# Patient Record
Sex: FEMALE | Race: BLACK OR AFRICAN AMERICAN | NOT HISPANIC OR LATINO | URBAN - METROPOLITAN AREA
[De-identification: names, ages, dates, MRNs, and addresses within clinical notes are randomized per-mention and may not be internally consistent; named-entity substitution may affect disease eponyms.]

---

## 2018-08-31 ENCOUNTER — EMERGENCY (EMERGENCY)
Facility: HOSPITAL | Age: 38
LOS: 0 days | Discharge: HOME | End: 2018-08-31
Attending: EMERGENCY MEDICINE
Payer: COMMERCIAL

## 2018-08-31 VITALS
DIASTOLIC BLOOD PRESSURE: 88 MMHG | SYSTOLIC BLOOD PRESSURE: 120 MMHG | TEMPERATURE: 99 F | OXYGEN SATURATION: 98 % | RESPIRATION RATE: 18 BRPM | HEART RATE: 97 BPM

## 2018-08-31 VITALS
RESPIRATION RATE: 17 BRPM | TEMPERATURE: 98 F | WEIGHT: 177.91 LBS | HEART RATE: 109 BPM | OXYGEN SATURATION: 99 % | DIASTOLIC BLOOD PRESSURE: 88 MMHG | SYSTOLIC BLOOD PRESSURE: 141 MMHG

## 2018-08-31 DIAGNOSIS — K61.1 RECTAL ABSCESS: ICD-10-CM

## 2018-08-31 DIAGNOSIS — L02.31 CUTANEOUS ABSCESS OF BUTTOCK: ICD-10-CM

## 2018-08-31 LAB — HCG UR QL: NEGATIVE — SIGNIFICANT CHANGE UP

## 2018-08-31 PROCEDURE — 46040 I&D ISCHIORCT&/PERIRCT ABSC: CPT

## 2018-08-31 PROCEDURE — 99283 EMERGENCY DEPT VISIT LOW MDM: CPT | Mod: 25

## 2018-08-31 RX ORDER — AZTREONAM 2 G
1 VIAL (EA) INJECTION
Qty: 14 | Refills: 0 | OUTPATIENT
Start: 2018-08-31 | End: 2018-09-06

## 2018-08-31 RX ORDER — OXYCODONE AND ACETAMINOPHEN 5; 325 MG/1; MG/1
1 TABLET ORAL ONCE
Qty: 0 | Refills: 0 | Status: DISCONTINUED | OUTPATIENT
Start: 2018-08-31 | End: 2018-08-31

## 2018-08-31 RX ADMIN — Medication 1 TABLET(S): at 12:24

## 2018-08-31 NOTE — ED PROVIDER NOTE - OBJECTIVE STATEMENT
38 yo female presents with abscess to buttock area 3-4 days, pain @6. Patient denies fever, chills, nausea. +history of similar.

## 2018-08-31 NOTE — ED ADULT NURSE NOTE - OBJECTIVE STATEMENT
pt c/o perianal abscess x 3-4 days, sent to ED from urgent care today. topical lidocaine placed prior to arrival. hx: perianal abscess.

## 2018-08-31 NOTE — ED ADULT TRIAGE NOTE - CHIEF COMPLAINT QUOTE
Pt with abscess to perianal area for 3 to 4 days .She has had abscess to the same area in the past. She was seen at urgent care and referred to ed. The abscess 3 cm midline LMP 8/29/2018

## 2018-09-03 LAB
CULTURE RESULTS: SIGNIFICANT CHANGE UP
SPECIMEN SOURCE: SIGNIFICANT CHANGE UP

## 2018-09-07 ENCOUNTER — EMERGENCY (EMERGENCY)
Facility: HOSPITAL | Age: 38
LOS: 0 days | Discharge: HOME | End: 2018-09-07
Attending: EMERGENCY MEDICINE
Payer: COMMERCIAL

## 2018-09-07 VITALS
SYSTOLIC BLOOD PRESSURE: 125 MMHG | DIASTOLIC BLOOD PRESSURE: 84 MMHG | OXYGEN SATURATION: 100 % | RESPIRATION RATE: 18 BRPM | HEART RATE: 90 BPM

## 2018-09-07 VITALS
DIASTOLIC BLOOD PRESSURE: 80 MMHG | HEART RATE: 92 BPM | SYSTOLIC BLOOD PRESSURE: 90 MMHG | WEIGHT: 175.93 LBS | TEMPERATURE: 98 F | OXYGEN SATURATION: 100 % | HEIGHT: 64 IN | RESPIRATION RATE: 18 BRPM

## 2018-09-07 DIAGNOSIS — R53.83 OTHER FATIGUE: ICD-10-CM

## 2018-09-07 DIAGNOSIS — R53.1 WEAKNESS: ICD-10-CM

## 2018-09-07 LAB
ALBUMIN SERPL ELPH-MCNC: 3.2 G/DL — LOW (ref 3.3–5)
ALP SERPL-CCNC: 57 U/L — SIGNIFICANT CHANGE UP (ref 40–120)
ALT FLD-CCNC: 24 U/L — SIGNIFICANT CHANGE UP (ref 12–78)
ANION GAP SERPL CALC-SCNC: 8 MMOL/L — SIGNIFICANT CHANGE UP (ref 5–17)
AST SERPL-CCNC: 17 U/L — SIGNIFICANT CHANGE UP (ref 15–37)
BASOPHILS # BLD AUTO: 0 K/UL — SIGNIFICANT CHANGE UP (ref 0–0.2)
BASOPHILS NFR BLD AUTO: 0 % — SIGNIFICANT CHANGE UP (ref 0–2)
BILIRUB SERPL-MCNC: 0.3 MG/DL — SIGNIFICANT CHANGE UP (ref 0.2–1.2)
BUN SERPL-MCNC: 6 MG/DL — LOW (ref 7–23)
CALCIUM SERPL-MCNC: 8.4 MG/DL — LOW (ref 8.5–10.1)
CHLORIDE SERPL-SCNC: 106 MMOL/L — SIGNIFICANT CHANGE UP (ref 96–108)
CK MB BLD-MCNC: <1.2 % — SIGNIFICANT CHANGE UP (ref 0–3.5)
CK MB CFR SERPL CALC: <1 NG/ML — SIGNIFICANT CHANGE UP (ref 0.5–3.6)
CK SERPL-CCNC: 86 U/L — SIGNIFICANT CHANGE UP (ref 26–192)
CO2 SERPL-SCNC: 25 MMOL/L — SIGNIFICANT CHANGE UP (ref 22–31)
CREAT SERPL-MCNC: 1.09 MG/DL — SIGNIFICANT CHANGE UP (ref 0.5–1.3)
EOSINOPHIL # BLD AUTO: 0 K/UL — SIGNIFICANT CHANGE UP (ref 0–0.5)
EOSINOPHIL NFR BLD AUTO: 0 % — SIGNIFICANT CHANGE UP (ref 0–6)
GLUCOSE SERPL-MCNC: 88 MG/DL — SIGNIFICANT CHANGE UP (ref 70–99)
HCG SERPL-ACNC: <1 MIU/ML — SIGNIFICANT CHANGE UP
HCT VFR BLD CALC: 32.6 % — LOW (ref 34.5–45)
HGB BLD-MCNC: 10.8 G/DL — LOW (ref 11.5–15.5)
LYMPHOCYTES # BLD AUTO: 1.19 K/UL — SIGNIFICANT CHANGE UP (ref 1–3.3)
LYMPHOCYTES # BLD AUTO: 20 % — SIGNIFICANT CHANGE UP (ref 13–44)
MAGNESIUM SERPL-MCNC: 2.2 MG/DL — SIGNIFICANT CHANGE UP (ref 1.6–2.6)
MANUAL SMEAR VERIFICATION: SIGNIFICANT CHANGE UP
MCHC RBC-ENTMCNC: 28.2 PG — SIGNIFICANT CHANGE UP (ref 27–34)
MCHC RBC-ENTMCNC: 33.1 GM/DL — SIGNIFICANT CHANGE UP (ref 32–36)
MCV RBC AUTO: 85.1 FL — SIGNIFICANT CHANGE UP (ref 80–100)
MONOCYTES # BLD AUTO: 0.36 K/UL — SIGNIFICANT CHANGE UP (ref 0–0.9)
MONOCYTES NFR BLD AUTO: 6 % — SIGNIFICANT CHANGE UP (ref 2–14)
NEUTROPHILS # BLD AUTO: 4.4 K/UL — SIGNIFICANT CHANGE UP (ref 1.8–7.4)
NEUTROPHILS NFR BLD AUTO: 74 % — SIGNIFICANT CHANGE UP (ref 43–77)
NRBC # BLD: 0 /100 — SIGNIFICANT CHANGE UP (ref 0–0)
NRBC # BLD: SIGNIFICANT CHANGE UP /100 WBCS (ref 0–0)
PLAT MORPH BLD: NORMAL — SIGNIFICANT CHANGE UP
PLATELET # BLD AUTO: 260 K/UL — SIGNIFICANT CHANGE UP (ref 150–400)
PLATELET COUNT - ESTIMATE: NORMAL — SIGNIFICANT CHANGE UP
POTASSIUM SERPL-MCNC: 4.3 MMOL/L — SIGNIFICANT CHANGE UP (ref 3.5–5.3)
POTASSIUM SERPL-SCNC: 4.3 MMOL/L — SIGNIFICANT CHANGE UP (ref 3.5–5.3)
PROT SERPL-MCNC: 7.7 GM/DL — SIGNIFICANT CHANGE UP (ref 6–8.3)
RBC # BLD: 3.83 M/UL — SIGNIFICANT CHANGE UP (ref 3.8–5.2)
RBC # FLD: 15.6 % — HIGH (ref 10.3–14.5)
RBC BLD AUTO: NORMAL — SIGNIFICANT CHANGE UP
SODIUM SERPL-SCNC: 139 MMOL/L — SIGNIFICANT CHANGE UP (ref 135–145)
WBC # BLD: 5.95 K/UL — SIGNIFICANT CHANGE UP (ref 3.8–10.5)
WBC # FLD AUTO: 5.95 K/UL — SIGNIFICANT CHANGE UP (ref 3.8–10.5)

## 2018-09-07 PROCEDURE — 99284 EMERGENCY DEPT VISIT MOD MDM: CPT

## 2018-09-07 NOTE — ED ADULT NURSE NOTE - NS ED NURSE DC INFO COMPLEXITY
Patient asked questions/Simple: Patient demonstrates quick and easy understanding/Verbalized Understanding/Straightforward: Basic instructions, no meds, no home treatment

## 2018-09-07 NOTE — ED ADULT TRIAGE NOTE - CHIEF COMPLAINT QUOTE
feels pressure on tights and arms since Friday , and if stand too long stared feeling fatigue and feels like going to passed out

## 2018-09-07 NOTE — ED PROVIDER NOTE - MEDICAL DECISION MAKING DETAILS
patietn presnts with prox muscle fatigue.  VSS.  Lab values do not require emergent intervention. patient smiling.  feeling well.  will f/u.  Uneventful ED observation period. Non toxic.  Well appearing. Discussed results and outcome of testing with the patient.  Patient advised to please follow up with their primary care doctor within the next 24 hours and return to the Emergency Department for worsening symptoms or any other concerns.  Patient advised that their doctor may call  to follow up on the specific results of the tests performed today in the emergency department.

## 2018-09-07 NOTE — ED ADULT NURSE NOTE - OBJECTIVE STATEMENT
PT STATES " Since last Friday I have been feeling an achy pressure in both my legs and arms. I also had flu like symptoms last week but that has resolved this weak. I do feel a headache over left eye scale of 5 pain." PT STATES " I HAVE BEEN TAKING APPLE CIDER VINEGAR TABLETS FOR THE PAST WEEK."

## 2018-09-07 NOTE — ED PROVIDER NOTE - OBJECTIVE STATEMENT
Pertinent PMH/PSH/FHx/SHx and Review of Systems contained within:  Patient presents to the ED for prox muscle fatigue without demonstrable weakness.  had I&D one week ago and sent on bactrim.  VSS.  no PMH.  LMP 2 weeks ago.  otherwise basleine.  normal daily function "just feels like my muscles are tired."  no other complaints.  Non toxic.  Well appearing. No aggravating or relieving factors. No other pertinent PMH.  No other pertinent PSH.  No other pertinent FHx.  Patient denies EtOH/tobacco/illicit substance use. No fever/chills, No photophobia/eye pain/changes in vision, No ear pain/sore throat/dysphagia, No chest pain/palpitations, no SOB/cough/wheeze/stridor, No abdominal pain, No N/V/D, no dysuria/frequency/discharge, No neck/back pain, no rash, no changes in neurological status/function.

## 2018-09-07 NOTE — ED PROVIDER NOTE - PHYSICAL EXAMINATION
Gen: Alert, NAD Head: NC, AT, PERRL, EOMI, normal lids/conjunctiva ENT:, normal hearing, patent oropharynx without erythema/exudate, uvula midline Neck: +supple, no tenderness/meningismus/JVD, +Trachea midline  Pulm: Bilateral BS, normal resp effort, no wheeze/stridor/retractions CV: RRR, no M/R/G, +dist pulses Abd: soft, NT/ND, +BS, no hepatosplenomegaly Mskel: no edema/erythema/cyanosis Skin: no rash Neuro: AAOx3, no sensory/motor deficits, CN 2-12 intact

## 2019-03-22 ENCOUNTER — EMERGENCY (EMERGENCY)
Facility: HOSPITAL | Age: 39
LOS: 0 days | Discharge: HOME | End: 2019-03-23
Attending: EMERGENCY MEDICINE
Payer: MEDICARE

## 2019-03-22 VITALS
RESPIRATION RATE: 18 BRPM | WEIGHT: 179.02 LBS | DIASTOLIC BLOOD PRESSURE: 102 MMHG | OXYGEN SATURATION: 100 % | SYSTOLIC BLOOD PRESSURE: 152 MMHG | HEART RATE: 84 BPM | TEMPERATURE: 98 F | HEIGHT: 64 IN

## 2019-03-22 DIAGNOSIS — S52.602A UNSPECIFIED FRACTURE OF LOWER END OF LEFT ULNA, INITIAL ENCOUNTER FOR CLOSED FRACTURE: ICD-10-CM
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
## 2019-03-22 DIAGNOSIS — M25.532 PAIN IN LEFT WRIST: ICD-10-CM
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
## 2019-03-22 DIAGNOSIS — S52.502A UNSPECIFIED FRACTURE OF THE LOWER END OF LEFT RADIUS, INITIAL ENCOUNTER FOR CLOSED FRACTURE: ICD-10-CM
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
## 2019-03-22 PROCEDURE — 99284 EMERGENCY DEPT VISIT MOD MDM: CPT | Mod: 25

## 2019-03-22 RX ORDER — OXYCODONE AND ACETAMINOPHEN 5; 325 MG/1; MG/1
1 TABLET ORAL ONCE
Qty: 0 | Refills: 0 | Status: DISCONTINUED | OUTPATIENT
Start: 2019-03-22 | End: 2019-03-22

## 2019-03-22 RX ORDER — IBUPROFEN 200 MG
600 TABLET ORAL ONCE
Qty: 0 | Refills: 0 | Status: COMPLETED | OUTPATIENT
Start: 2019-03-22 | End: 2019-03-22

## 2019-03-22 NOTE — ED PROVIDER NOTE - OBJECTIVE STATEMENT
Pertinent PMH/PSH/FHx/SHx and Review of Systems contained within:  Patient presents to the ED for left wrist pain.  Patient had fall on outstretched hand today while skating, has wrist pain with deformity.  Denies pain in elbow, shoulder, or clavicle.  Denies head injury, LOC, or use of blood thinners.    Relevant PMHx/SHx/SOCHx/FAMH:  Denies relevant pmh or psh  Patient denies EtOH/tobacco/illicit substance use.    ROS: No fever/chills, No headache/photophobia/eye pain/changes in vision, No ear pain/sore throat/dysphagia, No chest pain/palpitations, no SOB/cough/wheeze/stridor, No abdominal pain, No N/V/D/melena, no dysuria/frequency/discharge, No neck/back pain, no rash, no changes in neurological status/function.

## 2019-03-22 NOTE — ED PROVIDER NOTE - PRINCIPAL DIAGNOSIS
Wrist fracture, closed, left, initial encounter Closed fracture of distal end of left radius, unspecified fracture morphology, initial encounter

## 2019-03-22 NOTE — ED PROVIDER NOTE - PHYSICAL EXAMINATION
Gen: Alert, mild distress on movement, well appearing  Head: NC, AT, PERRL, EOMI, normal lids/conjunctiva  ENT: normal hearing, patent oropharynx without erythema/exudate, uvula midline  Neck: +supple, no tenderness, +Trachea midline  Pulm: Bilateral BS, normal resp effort, no wheeze/stridor/retractions  CV: RRR, no M/R/G, +dist pulses  Abd: soft, NT/ND, +BS, no palpable masses  Mskel: no edema/erythema/cyanosis, +left wrist deformity with swelling and tenderness, distal pulses intact  Skin: no rash, warm/dry  Neuro: AAOx3, no apparent sensory/motor deficits, coordination intact

## 2019-03-22 NOTE — ED PROVIDER NOTE - CLINICAL SUMMARY MEDICAL DECISION MAKING FREE TEXT BOX
Patient with closed displaced left wrist fracture.  VSS.  Patient seen and splinted by ortho, post redux films reviewed by ortho, see their consult note.  Patient to f/u with ortho, splint care discussed. Discussed results and outcome of today's visit with the patient.  Patient advised to please follow up with another healthcare provider within the next 24 hours and return to the Emergency Department for worsening symptoms or any other concerns.  Patient advised that their doctor may call  to follow up on the specific results of the tests performed today in the emergency department.   Patient appears well on discharge.

## 2019-03-22 NOTE — ED PROVIDER NOTE - CARE PLAN
Principal Discharge DX:	Wrist fracture, closed, left, initial encounter Principal Discharge DX:	Closed fracture of distal end of left radius, unspecified fracture morphology, initial encounter  Secondary Diagnosis:	Closed fracture of distal end of left ulna, unspecified fracture morphology, initial encounter

## 2019-03-23 PROCEDURE — 73130 X-RAY EXAM OF HAND: CPT | Mod: 26,LT

## 2019-03-23 PROCEDURE — 73090 X-RAY EXAM OF FOREARM: CPT | Mod: 26,LT

## 2019-03-23 PROCEDURE — 73110 X-RAY EXAM OF WRIST: CPT | Mod: 26,LT,76

## 2019-03-23 RX ORDER — IBUPROFEN 200 MG
1 TABLET ORAL
Qty: 20 | Refills: 0 | OUTPATIENT
Start: 2019-03-23 | End: 2019-03-27

## 2019-03-23 RX ADMIN — Medication 600 MILLIGRAM(S): at 00:23

## 2019-03-23 RX ADMIN — OXYCODONE AND ACETAMINOPHEN 1 TABLET(S): 5; 325 TABLET ORAL at 01:25

## 2019-03-23 RX ADMIN — Medication 600 MILLIGRAM(S): at 00:01

## 2019-03-23 RX ADMIN — OXYCODONE AND ACETAMINOPHEN 1 TABLET(S): 5; 325 TABLET ORAL at 00:23
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
## 2019-03-23 NOTE — ED ADULT NURSE NOTE - CHIEF COMPLAINT QUOTE
c/o l wrist pain s/p fell and injured while roller skating l fingers warm pink and mobile ice and immobilization applied prior to arrival

## 2019-03-23 NOTE — CONSULT NOTE ADULT - ASSESSMENT
38y LHD Female with L Distal Radius Fracture s/p Sugartong splint application  NWB LUE in Sugartong splint with sling for comfort  Keep sling/splint clean, dry, and intact  Do not get splint wet as it will break  Rest, ice, and elevate affected extremity  Post-reduction XR show fracture in adequate anatomic alignment   Discussed with patient, given level of comminution/fracture pattern pt may benefit from operative fixation   Discussed signs symptoms of compartment syndrome, advised to return to OR if symptoms develop  No further Orthopaedic surgical intervention at this time.  Please follow up with Dr. Holliday in office within 5 days of DC from ED. Call office for appointment first thing Monday morning  Plan communicated to ED attending  Pt to be discussed and imaging reviewed with Dr. Hloliday  Ortho stable for DC 38y LHD Female with L Distal Radius and Ulnar Styloid Fracture s/p Sugartong splint application  NWB LUE in Sugartong splint with sling for comfort  Keep sling/splint clean, dry, and intact  Do not get splint wet as it will break  Rest, ice, and elevate affected extremity  Post-reduction XR show fracture in adequate anatomic alignment   Discussed with patient, given level of comminution/fracture pattern pt may benefit from operative fixation   Discussed signs symptoms of compartment syndrome, advised to return to OR if symptoms develop  No further Orthopaedic surgical intervention at this time.  Please follow up with Dr. Holliday in office within 5 days of DC from ED. Call office for appointment first thing Monday morning  Plan communicated to ED attending  Pt to be discussed and imaging reviewed with Dr. Holliday  Ortho stable for DC

## 2019-03-23 NOTE — ED ADULT NURSE NOTE - OBJECTIVE STATEMENT
pt arrives w/ c/o left wrist pain. pt states she was skating and fell. pt landed on left wrist. pt states pain from forearm to wrist. pt denies any nausea , vomiting. pt arrives w/ c/o left wrist pain. pt states she was skating and fell. pt landed on left wrist. pt states pain from forearm to wrist. pt denies any nausea , vomiting.pt appears comfortable and in NAd. waiting results of x ray will continue to monitor.

## 2019-03-25 ENCOUNTER — APPOINTMENT (OUTPATIENT)
Dept: ORTHOPEDIC SURGERY | Facility: CLINIC | Age: 39
End: 2019-03-25
Payer: COMMERCIAL

## 2019-03-25 VITALS
HEIGHT: 64 IN | BODY MASS INDEX: 30.56 KG/M2 | DIASTOLIC BLOOD PRESSURE: 91 MMHG | HEART RATE: 83 BPM | SYSTOLIC BLOOD PRESSURE: 135 MMHG | WEIGHT: 179 LBS

## 2019-03-25 DIAGNOSIS — Z78.9 OTHER SPECIFIED HEALTH STATUS: ICD-10-CM

## 2019-03-25 DIAGNOSIS — Z60.2 PROBLEMS RELATED TO LIVING ALONE: ICD-10-CM

## 2019-03-25 DIAGNOSIS — Z82.61 FAMILY HISTORY OF ARTHRITIS: ICD-10-CM

## 2019-03-25 DIAGNOSIS — Z80.9 FAMILY HISTORY OF MALIGNANT NEOPLASM, UNSPECIFIED: ICD-10-CM

## 2019-03-25 PROCEDURE — 99203 OFFICE O/P NEW LOW 30 MIN: CPT

## 2019-03-25 RX ORDER — IBUPROFEN 600 MG
600 TABLET ORAL
Refills: 0 | Status: ACTIVE | COMMUNITY

## 2019-03-25 RX ORDER — PSYLLIUM HUSK 0.4 G
CAPSULE ORAL
Refills: 0 | Status: ACTIVE | COMMUNITY

## 2019-03-25 RX ORDER — CHOLECALCIFEROL (VITAMIN D3) 25 MCG
TABLET ORAL
Refills: 0 | Status: ACTIVE | COMMUNITY

## 2019-03-25 RX ORDER — CALCIUM CARBONATE 260MG(650)
TABLET,CHEWABLE ORAL
Refills: 0 | Status: ACTIVE | COMMUNITY

## 2019-03-25 RX ORDER — LACTOBACILLUS ACIDOPHILUS/PECT 30 MG-20MG
TABLET ORAL
Refills: 0 | Status: ACTIVE | COMMUNITY

## 2019-03-25 RX ORDER — IBUPROFEN 600 MG/1
600 TABLET, FILM COATED ORAL
Qty: 20 | Refills: 0 | Status: ACTIVE | COMMUNITY
Start: 2019-03-22

## 2019-03-25 SDOH — SOCIAL STABILITY - SOCIAL INSECURITY: PROBLEMS RELATED TO LIVING ALONE: Z60.2

## 2019-03-26 RX ORDER — OXYCODONE AND ACETAMINOPHEN 5; 325 MG/1; MG/1
5-325 TABLET ORAL
Qty: 20 | Refills: 0 | Status: COMPLETED | COMMUNITY
Start: 2019-03-26 | End: 2019-03-26

## 2019-03-26 RX ORDER — OXYCODONE AND ACETAMINOPHEN 5; 325 MG/1; MG/1
5-325 TABLET ORAL
Qty: 20 | Refills: 0 | Status: ACTIVE | COMMUNITY
Start: 2019-03-26 | End: 1900-01-01

## 2019-03-26 RX ORDER — OXYCODONE AND ACETAMINOPHEN 5; 325 MG/1; MG/1
5-325 TABLET ORAL
Qty: 20 | Refills: 0 | Status: DISCONTINUED | COMMUNITY
Start: 2019-03-26 | End: 2019-03-26

## 2019-03-27 ENCOUNTER — OUTPATIENT (OUTPATIENT)
Dept: OUTPATIENT SERVICES | Facility: HOSPITAL | Age: 39
LOS: 1 days | End: 2019-03-27
Payer: COMMERCIAL

## 2019-03-27 VITALS
HEIGHT: 64 IN | RESPIRATION RATE: 16 BRPM | WEIGHT: 179.9 LBS | SYSTOLIC BLOOD PRESSURE: 125 MMHG | OXYGEN SATURATION: 100 % | TEMPERATURE: 98 F | HEART RATE: 80 BPM | DIASTOLIC BLOOD PRESSURE: 84 MMHG

## 2019-03-27 DIAGNOSIS — G56.02 CARPAL TUNNEL SYNDROME, LEFT UPPER LIMB: ICD-10-CM

## 2019-03-27 DIAGNOSIS — Z01.818 ENCOUNTER FOR OTHER PREPROCEDURAL EXAMINATION: ICD-10-CM

## 2019-03-27 DIAGNOSIS — S52.351A DISPLACED COMMINUTED FRACTURE OF SHAFT OF RADIUS, RIGHT ARM, INITIAL ENCOUNTER FOR CLOSED FRACTURE: ICD-10-CM

## 2019-03-27 DIAGNOSIS — S52.352A DISPLACED COMMINUTED FRACTURE OF SHAFT OF RADIUS, LEFT ARM, INITIAL ENCOUNTER FOR CLOSED FRACTURE: ICD-10-CM

## 2019-03-27 LAB
HCT VFR BLD CALC: 32.3 % — LOW (ref 34.5–45)
HGB BLD-MCNC: 10.8 G/DL — LOW (ref 11.5–15.5)
MCHC RBC-ENTMCNC: 30.3 PG — SIGNIFICANT CHANGE UP (ref 27–34)
MCHC RBC-ENTMCNC: 33.4 GM/DL — SIGNIFICANT CHANGE UP (ref 32–36)
MCV RBC AUTO: 90.7 FL — SIGNIFICANT CHANGE UP (ref 80–100)
PLATELET # BLD AUTO: 287 K/UL — SIGNIFICANT CHANGE UP (ref 150–400)
RBC # BLD: 3.56 M/UL — LOW (ref 3.8–5.2)
RBC # FLD: 14.8 % — HIGH (ref 10.3–14.5)
WBC # BLD: 6.3 K/UL — SIGNIFICANT CHANGE UP (ref 3.8–10.5)
WBC # FLD AUTO: 6.3 K/UL — SIGNIFICANT CHANGE UP (ref 3.8–10.5)

## 2019-03-27 RX ORDER — LIDOCAINE HCL 20 MG/ML
0.2 VIAL (ML) INJECTION ONCE
Qty: 0 | Refills: 0 | Status: DISCONTINUED | OUTPATIENT
Start: 2019-03-29 | End: 2019-04-13

## 2019-03-27 RX ORDER — SODIUM CHLORIDE 9 MG/ML
3 INJECTION INTRAMUSCULAR; INTRAVENOUS; SUBCUTANEOUS EVERY 8 HOURS
Qty: 0 | Refills: 0 | Status: DISCONTINUED | OUTPATIENT
Start: 2019-03-29 | End: 2019-04-13

## 2019-03-27 NOTE — H&P PST ADULT - NSANTHOSAYNRD_GEN_A_CORE
No. [place] screening performed.  STOP BANG Legend: 0-2 = LOW Risk; 3-4 = INTERMEDIATE Risk; 5-8 = HIGH Risk

## 2019-03-27 NOTE — H&P PST ADULT - NSICDXPASTMEDICALHX_GEN_ALL_CORE_FT
PAST MEDICAL HISTORY:  Ilda-rectal abscess PAST MEDICAL HISTORY:  Functional heart murmur     Ilda-rectal abscess

## 2019-03-27 NOTE — H&P PST ADULT - HISTORY OF PRESENT ILLNESS
37 yo female with no significant PMH c/o left wrist pain s/p fall while roller skating on 03/22/2019. Pt had ED admission-X-ray left hand reveled displaced comminuted fracture.  Pt scheduled for left ORIF distal radius

## 2019-03-27 NOTE — H&P PST ADULT - NSICDXPROBLEM_GEN_ALL_CORE_FT
PROBLEM DIAGNOSES  Problem: Closed displaced comminuted fracture of shaft of right radius, initial encounter  Assessment and Plan: Left carpal tunnel release  Left  ORIF distal radius  Labs- CBC  Pre-op instructions discussed

## 2019-03-27 NOTE — H&P PST ADULT - MUSCULOSKELETAL
details… detailed exam Left arm soft cast with sling, fingers W&M, with minimal edema/decreased ROM due to pain/diminished strength

## 2019-03-28 ENCOUNTER — TRANSCRIPTION ENCOUNTER (OUTPATIENT)
Age: 39
End: 2019-03-28

## 2019-03-28 RX ORDER — CELECOXIB 200 MG/1
200 CAPSULE ORAL ONCE
Qty: 0 | Refills: 0 | Status: DISCONTINUED | OUTPATIENT
Start: 2019-03-29 | End: 2019-04-13

## 2019-03-28 RX ORDER — OXYCODONE HYDROCHLORIDE 5 MG/1
5 TABLET ORAL ONCE
Qty: 0 | Refills: 0 | Status: DISCONTINUED | OUTPATIENT
Start: 2019-03-29 | End: 2019-03-29

## 2019-03-28 RX ORDER — ONDANSETRON 8 MG/1
4 TABLET, FILM COATED ORAL ONCE
Qty: 0 | Refills: 0 | Status: DISCONTINUED | OUTPATIENT
Start: 2019-03-29 | End: 2019-04-13

## 2019-03-28 RX ORDER — SODIUM CHLORIDE 9 MG/ML
1000 INJECTION, SOLUTION INTRAVENOUS
Qty: 0 | Refills: 0 | Status: DISCONTINUED | OUTPATIENT
Start: 2019-03-29 | End: 2019-04-13

## 2019-03-28 NOTE — ASU DISCHARGE PLAN (ADULT/PEDIATRIC) - CARE PROVIDER_API CALL
Steffanie Mueller (MD; MPH)  Orthopaedic Surgery  [address], [address]  [place] [address]  Phone: [phone]  Fax: [phone]  Follow Up Time:

## 2019-03-28 NOTE — ASU DISCHARGE PLAN (ADULT/PEDIATRIC) - CALL YOUR DOCTOR IF YOU HAVE ANY OF THE FOLLOWING:
Numbness, tingling, color or temperature change to extremity Swelling that gets worse/Fever greater than (need to indicate Fahrenheit or Celsius)/Wound/Surgical Site with redness, or foul smelling discharge or pus/Pain not relieved by Medications/Bleeding that does not stop/Numbness, tingling, color or temperature change to extremity/Increased irritability or sluggishness/Nausea and vomiting that does not stop/Inability to tolerate liquids or foods

## 2019-03-29 ENCOUNTER — APPOINTMENT (OUTPATIENT)
Dept: ORTHOPEDIC SURGERY | Facility: HOSPITAL | Age: 39
End: 2019-03-29

## 2019-03-29 ENCOUNTER — OUTPATIENT (OUTPATIENT)
Dept: OUTPATIENT SERVICES | Facility: HOSPITAL | Age: 39
LOS: 1 days | End: 2019-03-29
Payer: COMMERCIAL

## 2019-03-29 VITALS
DIASTOLIC BLOOD PRESSURE: 84 MMHG | TEMPERATURE: 99 F | OXYGEN SATURATION: 100 % | HEART RATE: 80 BPM | SYSTOLIC BLOOD PRESSURE: 142 MMHG | WEIGHT: 179.9 LBS | RESPIRATION RATE: 16 BRPM | HEIGHT: 64 IN

## 2019-03-29 VITALS
HEART RATE: 84 BPM | DIASTOLIC BLOOD PRESSURE: 80 MMHG | SYSTOLIC BLOOD PRESSURE: 132 MMHG | TEMPERATURE: 98 F | OXYGEN SATURATION: 100 % | RESPIRATION RATE: 16 BRPM

## 2019-03-29 DIAGNOSIS — S52.352A DISPLACED COMMINUTED FRACTURE OF SHAFT OF RADIUS, LEFT ARM, INITIAL ENCOUNTER FOR CLOSED FRACTURE: ICD-10-CM

## 2019-03-29 DIAGNOSIS — G56.02 CARPAL TUNNEL SYNDROME, LEFT UPPER LIMB: ICD-10-CM

## 2019-03-29 PROCEDURE — G0463: CPT

## 2019-03-29 PROCEDURE — 25609 OPTX DST RD XART FX/EP SEP3+: CPT | Mod: LT

## 2019-03-29 PROCEDURE — 64721 CARPAL TUNNEL SURGERY: CPT | Mod: LT

## 2019-03-29 PROCEDURE — 85027 COMPLETE CBC AUTOMATED: CPT

## 2019-03-29 PROCEDURE — 76000 FLUOROSCOPY <1 HR PHYS/QHP: CPT

## 2019-03-29 PROCEDURE — C1713: CPT

## 2019-03-29 RX ORDER — ACETAMINOPHEN 500 MG
1000 TABLET ORAL ONCE
Qty: 0 | Refills: 0 | Status: COMPLETED | OUTPATIENT
Start: 2019-03-29 | End: 2019-03-29

## 2019-03-29 RX ORDER — CELECOXIB 200 MG/1
200 CAPSULE ORAL ONCE
Qty: 0 | Refills: 0 | Status: COMPLETED | OUTPATIENT
Start: 2019-03-29 | End: 2019-03-29

## 2019-03-29 RX ORDER — CEFAZOLIN SODIUM 1 G
2000 VIAL (EA) INJECTION ONCE
Qty: 0 | Refills: 0 | Status: COMPLETED | OUTPATIENT
Start: 2019-03-29 | End: 2019-03-29

## 2019-03-29 RX ADMIN — CELECOXIB 200 MILLIGRAM(S): 200 CAPSULE ORAL at 13:02

## 2019-03-29 RX ADMIN — Medication 1000 MILLIGRAM(S): at 13:02

## 2019-03-29 NOTE — BRIEF OPERATIVE NOTE - NSICDXBRIEFPROCEDURE_GEN_ALL_CORE_FT
PROCEDURES:  Carpal tunnel release 29-Mar-2019 16:25:07  Cristy Escobar  Open reduction and internal fixation (ORIF) of fracture of left distal radius 29-Mar-2019 16:25:00  Cristy Escobar

## 2019-03-29 NOTE — PRE-ANESTHESIA EVALUATION ADULT - NSANTHADDINFOFT_GEN_ALL_CORE
RBA of regional supraclavicular block explained, patient understood, agreed to proceed.  Denies cervical spine pathology

## 2019-03-29 NOTE — BRIEF OPERATIVE NOTE - OPERATION/FINDINGS
See dictated report.  Findings - L distal radius fracture, carpal tunnel syndrome  Procedure - ORIF L distal radius fracture, carpal tunnel release volar splint

## 2019-04-08 ENCOUNTER — APPOINTMENT (OUTPATIENT)
Dept: ORTHOPEDIC SURGERY | Facility: CLINIC | Age: 39
End: 2019-04-08
Payer: COMMERCIAL

## 2019-04-08 PROBLEM — R01.0 BENIGN AND INNOCENT CARDIAC MURMURS: Chronic | Status: ACTIVE | Noted: 2019-03-27

## 2019-04-08 PROCEDURE — 99024 POSTOP FOLLOW-UP VISIT: CPT

## 2019-04-08 PROCEDURE — 73110 X-RAY EXAM OF WRIST: CPT | Mod: LT

## 2019-04-23 ENCOUNTER — TRANSCRIPTION ENCOUNTER (OUTPATIENT)
Age: 39
End: 2019-04-23

## 2019-05-10 ENCOUNTER — APPOINTMENT (OUTPATIENT)
Dept: ORTHOPEDIC SURGERY | Facility: CLINIC | Age: 39
End: 2019-05-10
Payer: COMMERCIAL

## 2019-05-10 VITALS
DIASTOLIC BLOOD PRESSURE: 87 MMHG | SYSTOLIC BLOOD PRESSURE: 130 MMHG | BODY MASS INDEX: 30.56 KG/M2 | HEART RATE: 80 BPM | HEIGHT: 64 IN | WEIGHT: 179 LBS

## 2019-05-10 PROCEDURE — 73110 X-RAY EXAM OF WRIST: CPT | Mod: LT

## 2019-05-10 PROCEDURE — 99024 POSTOP FOLLOW-UP VISIT: CPT

## 2019-06-10 ENCOUNTER — APPOINTMENT (OUTPATIENT)
Dept: ORTHOPEDIC SURGERY | Facility: CLINIC | Age: 39
End: 2019-06-10
Payer: COMMERCIAL

## 2019-06-10 VITALS
DIASTOLIC BLOOD PRESSURE: 84 MMHG | SYSTOLIC BLOOD PRESSURE: 126 MMHG | HEART RATE: 94 BPM | WEIGHT: 185 LBS | HEIGHT: 64 IN | BODY MASS INDEX: 31.58 KG/M2

## 2019-06-10 DIAGNOSIS — G56.02 CARPAL TUNNEL SYNDROME, LEFT UPPER LIMB: ICD-10-CM

## 2019-06-10 DIAGNOSIS — S52.572A OTHER INTRAARTICULAR FRACTURE OF LOWER END OF LEFT RADIUS, INITIAL ENCOUNTER FOR CLOSED FRACTURE: ICD-10-CM

## 2019-06-10 DIAGNOSIS — G56.21 LESION OF ULNAR NERVE, RIGHT UPPER LIMB: ICD-10-CM

## 2019-06-10 PROCEDURE — 99024 POSTOP FOLLOW-UP VISIT: CPT

## 2019-06-26 NOTE — ASU DISCHARGE PLAN (ADULT/PEDIATRIC) - ***IN THE EVENT THAT YOU DEVELOP A COMPLICATION AND YOU ARE UNABLE TO REACH YOUR OWN PHYSICIAN, YOU MAY CONTACT:
Gen: No fever, unable to tolerate feeds  Eyes: No eye irritation or discharge  ENT: No earpain, congestion, sore throat  Resp: No cough or trouble breathing  Cardiovascular: + chest pain or palpitation  Gastroenteric: No nausea/vomiting, diarrhea, constipation  : No dysuria  MS: No joint or muscle pain  Skin: No rashes  Neuro: + headache, numbness in hands and feet  Remainder negative, except as per the HPI Statement Selected

## 2019-10-02 PROBLEM — Z60.2 PERSON LIVING ALONE: Status: ACTIVE | Noted: 2019-03-25

## 2021-03-27 NOTE — ED ADULT TRIAGE NOTE - CHIEF COMPLAINT QUOTE
c/o l wrist pain s/p fell and injured while roller skating l fingers warm pink and mobile ice and immobilization applied prior to arrival dyspnea

## 2022-02-07 NOTE — ED ADULT NURSE NOTE - NSFALLRSKASSESSTYPE_ED_ALL_ED
Occupational Therapy 12S  Visit Type: treatment  Precautions:  Medical precautions:  fall risk; standard precautions.  COVID De-escalated  Lines:     Basic: telemetry and IV      Lines in chart and on patient reviewed, precautions maintained throughout session.  Hearing: no hearing deficits  Vision:     Current vision: wears glasses all the time  Safety Measures: chair alarm (call light in reach)    SUBJECTIVE  Patient agreed to participate in therapy this date.  \"I haven't been able to sleep much... I am trying to catch up on my sleep.\"  RN Tina mackenzieayed session  Pt complains of 8/10 pain in both feet. RN aware, and gave medication for pain.  Patient / Family Goal: return home and maximize function    OBJECTIVE         Oriented to person, place, situation and time     Arousal alertness: appropriate responses to stimuli    Affect/Behavior: alert and cooperative  Patient activity tolerance: 1 to 1 activity to rest  Functional Communication/Cognition    Overall status:  Within functional limits    Form of communication:  Verbal   Attention span:  Appears intact    Commands: follows multistep commands with repetition and follows multistep commands with increased time.    Transition between tasks: transition with cues.    Safety judgement: decreased awareness of need for assistance and decreased awareness of need for safety.    Awareness of deficits: decreased awareness of deficits.    Transfers:    Assistive devices: gait belt and 2-wheeled walker    Sit to stand: contact guard/touching/steadying assist    Stand to sit: supervision    Training completed:      Patient requires contact guard assist and supervision with 2ww to complete all functional transfers with increased time, assist for safety and steadying. Pt with one small LOB when standing to wash his buttocks. Pt states he tends to get dizzy when standing.   Functional Ambulation:    Assistance:patient refused    Details/Comments: Pt refused to do any walking  due to BLE pain in his feet. Pt complaining of 8/10 pain. Pt completed 3 sit<>stands and marching in place to get some activity and getting him off of his buttocks.   Activities of Daily Living (ADLs):  Grooming/Oral Hygiene:     Grooming assist: set up and stand by assist    Position: chair    Assist needed for: wash/dry face and wash/dry hands  Upper Body Dressing:    Assist: minimal assist    Position: chair    Assist needed for: pull around back, pull down in back and fasteners  Lower Body Dressing:     Assist: maximal assist    Position: chair    Footwear assistance: maximal assist    Footwear position: chair    Assist needed for: don/doff left sock and don/doff right sock  Bathing:     Assist: minimal assist (CHG Wipes)    Position: chair    Assist needed for: left lower leg including foot and right lower leg including foot  Activities of daily living training:   Pt used urinal at the beginning of session with mod I. Pt completed full body sponge bath with CHG wipes with overall Min A. Pt needing assistance for washing BLEs including feet. Pt donned clean depends with Max A, needing assistance to thread BLEs into underwear. Pt washed face with set up and stand by assist and a clean gown with Min assist for fasteners around the IV line. Pt did not want to walk to bathroom to complete self cares as his feet were in too much pain. Encouraged patient to elevate feet due to the swelling in them.       Interventions    Training provided: activity tolerance, balance retraining, energy conservation, transfer training and safety training  Skilled input: verbal instruction/cues  Verbal Consent: Writer verbally educated and received verbal consent for hand placement, positioning of patient, and techniques to be performed today from patient for clothing adjustments for techniques and therapist position for techniques as described above and how they are pertinent to the patient's plan of care.         ASSESSMENT    Impairments: activity tolerance, balance, cognitive, sensation, safety awareness, range of motion, pain, edema, decreased insight into deficits and strength  Functional Limitations: bed mobility, functional mobility, grooming, bathing, toileting, functional transfers, dressing and showering    Treatment today focused on UB/LB dressing and bathing, light grooming activity, strengthening.   Current overall ADL status is maximal assist for LB dressing of socks and underwear, Min A for donning new hospital gown, set up and stand by assist for light grooming activity. Current overall Functional Mobility for ADL and Instrumental-ADL tasks is contact guard assist with 2ww to stand from recliner chair with one small LOB due to feeling dizzy when standing. Then patient at supervision level when completing marching in place in front of recliner chair.  Patient displays  fair progress demonstrated by OT treatment session. Pt limited today by BLEs feet pain. Pt complaining of  Burning sensation in feet and 8/10 pain. RN notified and giving pain medication during therapy session.    Functional limitations at this time are listed above. Discussed patient goal, discharge planning options and therapy progress made to date with Nurse. Pt would continue to benefit from OT services to maximize independence and increase activity tolerance.     Discharge Recommendations:  Recommendations for Discharge: OT WI: Post acute therapy      PT/OT Mobility Equipment for Discharge: will need 2ww if DC home  PT/OT ADL Equipment for Discharge: continue to assess  OT Identified Barriers to Discharge: activity tolerance, weakness, balance, safety/cognition, fall risk, lives alone, medical status       Skilled therapy is required to address these limitations in attempt to maximize the patient's independence.  Progress: progressing toward goals    End of Session:   Location: in chair  Safety measures: alarm system in place/re-engaged,  lines intact and call light within reach  Handoff to: nurse  Education Provided:   Learning assessment:  - Primary learner: patient  - Are they ready to learn: yes  - Preferred learning style: verbal  - Barriers to learning: no barriers apparent at this time    PLAN  Suggestions for next session as indicated: LB dressing with wide sock aide and reacher, increase mobility, self cares at sink      Frequency Comments: MWThF 2/7          Interventions: activity tolerance training, ADL retraining, balance, bed mobility training, functional transfer training, patient education, transfer training, therapeutic exercise, upper extremity strengthening/ROM, therapeutic activity, safety training, edema management and energy conservation  Agreement to plan and goals: patient agrees with goals and treatment plan      GOALS  Review Date: 2/14/2022  Long Term Goals: (to be met by time of discharge from hospital)  Grooming: Patient will complete grooming tasks in standing modified independent.  Status: progressing/ongoing  Upper body dressing: Patient will complete upper body dressing in sitting modified independent.  Status: progressing/ongoing  Lower body dressing: Patient will complete lower body dressing in sitting modified independent.  Status: progressing/ongoing  Toileting: Patient will complete toileting modified independent.  Status: progressing/ongoing  Bathing: Patient will complete bathingmodified independent  Status: progressing/ongoingToilet transfer: Patient will complete toilet transfer with modified independent. Status: progressing/ongoing  Tub/shower transfer: Patient will complete tub/shower transfer with modified independent. Status: progressing/ongoing  Home setting transfer: Patient will complete home setting transfers with modified independent.  Status: progressing/ongoing  Item retrieval: Patient will complete item retrieval modified independent.   Status: progressing/ongoing        Documented in the chart in  the following areas: Assessment. Plan.      Therapy procedure time and total treatment time can be found documented on the Time Entry flowsheet   Initial (On Arrival)

## 2022-06-01 NOTE — ASU DISCHARGE PLAN (ADULT/PEDIATRIC) - ASU DISCHARGE DATE/TIME
I assumed care of Mame at 23:00 from Dr. Hammonds with mental health admission pending. She has had a pharmacy medication history; her home medications have been ordered.    There were no significant events during my shift.     I will be signing out her care at 07:00 to Dr. Hill with placement pending.          Elina Rodgers MD  06/01/22 0715     29-Mar-2019 17:22 29-Mar-2019 17:23

## 2024-01-17 ENCOUNTER — NON-APPOINTMENT (OUTPATIENT)
Age: 44
End: 2024-01-17

## 2024-12-25 ENCOUNTER — NON-APPOINTMENT (OUTPATIENT)
Age: 44
End: 2024-12-25